# Patient Record
Sex: FEMALE | Race: WHITE
[De-identification: names, ages, dates, MRNs, and addresses within clinical notes are randomized per-mention and may not be internally consistent; named-entity substitution may affect disease eponyms.]

---

## 2021-04-26 ENCOUNTER — HOSPITAL ENCOUNTER (OUTPATIENT)
Dept: HOSPITAL 61 - NM | Age: 66
End: 2021-04-26
Attending: INTERNAL MEDICINE
Payer: COMMERCIAL

## 2021-04-26 DIAGNOSIS — I51.7: Primary | ICD-10-CM

## 2021-04-26 PROCEDURE — 93306 TTE W/DOPPLER COMPLETE: CPT

## 2021-04-26 PROCEDURE — 78452 HT MUSCLE IMAGE SPECT MULT: CPT

## 2021-04-26 PROCEDURE — 93017 CV STRESS TEST TRACING ONLY: CPT

## 2021-04-26 PROCEDURE — A9500 TC99M SESTAMIBI: HCPCS

## 2021-04-26 NOTE — CARD
MR#: [id number]

Account#: [id number]

Accession#: [id number]

Date of Study: 04/26/2021

Ordering Physician: ROSE MARY SEALS, 

Referring Physician: ROSE MARY SEALS, 

Tech: Yoko Barnhart, [facility]





--------------- APPROVED REPORT --------------





EXAM: Two-dimensional and M-mode echocardiogram with Doppler and color Doppler.



Other Information 

Quality : AverageHR: 64bpm



INDICATION

Chest Pain 



2D DIMENSIONS 

RVDd3.4 (2.9-3.5cm)Left Atrium(2D)3.3 (1.6-4.0cm)

IVSd1.0 (0.7-1.1cm)Aortic Root(2D)3.2 (2.0-3.7cm)

LVDd5.7 (3.9-5.9cm)LVOT Diameter2.1 (1.8-2.4cm)

PWd1.0 (0.7-1.1cm)LVDs3.6 (2.5-4.0cm)

FS (%) 36.0 %.1 ml

LVEF(%)60.0 (>50%)



Aortic Valve

AoV Peak Ken.136.3cm/sAoV VTI27.5cm

AO Peak GR.7.4mmHgLVOT Peak Ken.98.7cm/s

LVOT  VTI 20.71cmAO Mean GR.4mmHg

AYLIN (VMAX)1.39tr0KXZ   (VTI)2.49cm2



Mitral Valve

MV E Padksfwc19.9cm/sMV DECEL [id number]

MV A Frkpvegr06.9cm/sMV PLB78kw

E/A  Ratio0.8MVA (PHT)2.64cm2



TDI

E/Lateral E'8.8E/Medial E'11.5



Pulmonary Valve

PV Peak Ujewwbbp28.0cm/sPV Peak Grad.3mmHg



Tricuspid Valve

TR P. Yzgfrwvc302pq/sRAP WZNCIHLJ4fjUt

TR Peak Gr.22opJtJOOO44zkEe



Pulmonary Vein

S1 Itrsyulc65.4cm/sD2 Qculmfuz54.8cm/s

PVa hqgummsc951gdsy



 LEFT VENTRICLE 

The left ventricle is normal size. There is borderline to mild concentric left ventricular hypertroph
y. The left ventricular systolic function is normal. The Ejection Fraction is 55%. There is normal LV
 segmental wall motion. Transmitral Doppler flow pattern is Grade I-abnormal relaxation pattern.



 RIGHT VENTRICLE 

The right ventricle is normal size. There is normal right ventricular wall thickness. The right ventr
icular systolic function is normal.



 ATRIA 

The left atrium size is normal. The right atrium size is normal. The interatrial septum is intact wit
h no evidence for an atrial septal defect or patent foramen ovale as noted on 2-D or Doppler imaging.




 AORTIC VALVE 

The aortic valve is normal in structure and function. Doppler and Color Flow revealed trace aortic re
gurgitation. Calculated aortic valve area is 2.43 cm2 with maximum pressure gradient of 10 mmHg and m
clarence pressure gradient of 5 mmHg. There is no significant aortic valvular stenosis.



 MITRAL VALVE 

The mitral valve is normal in structure and function. There is no evidence of mitral valve prolapse. 
There is no mitral valve stenosis. Doppler and Color-flow revealed trace mitral regurgitation.



 TRICUSPID VALVE 

The tricuspid valve is normal in structure and function. Doppler and Color Flow revealed trace tricus
pid regurgitation with an estimated PAP of 20 mmHg. There is no tricuspid valve stenosis.



 PULMONIC VALVE 

The pulmonic valve is not well visualized. Doppler and Color Flow revealed trace pulmonic valvular re
gurgitation.



 GREAT VESSELS 

The aortic root is normal in size. The ascending aorta is normal in size. The IVC is normal in size a
nd collapses >50% with inspiration.



 PERICARDIAL EFFUSION 

There is no evidence of significant pericardial effusion.



Critical Notification

Critical Value: No



<Conclusion>

The left ventricular systolic function is normal.

The Ejection Fraction is 55%.

There is normal LV segmental wall motion.

Transmitral Doppler flow pattern is Grade I-abnormal relaxation pattern.

Trace mitral regurgitation.

Trace tricuspid regurgitation with an estimated PAP of 20 mmHg.

There is no evidence of significant pericardial effusion.



Signed by : Rose Mary Seals, 

Electronically Approved : 04/26/2021 14:32:38

## 2021-04-26 NOTE — RAD
MR#: [id number]

Account#: [id number]

Accession#: [id number]

Date of Study: 04/26/2021

Ordering Physician: ROSE MARY GARCIA 

Referring Physician: DENIS DURON Tech: RT MARISELA Adamson) (N)





--------------- APPROVED REPORT --------------





Test Type:          Pharmacological

Stress Nurse/Tech: MAURICIO Rodrigues RN

Test Indications: high blood pressure, chest pain

Cardiac History: No known cardiac

Medications:     See Electronic Medical Record

Medical History: See Electronic Medical Record

Resting ECG:     SR

Resting Heart Rate: 56 bpm

Resting Blood Pressure: 133/89mmHg

Pretest Chest Pain: None



Nurse/Tech Notes

Lungs CTA, S1S2

Consent: The procedure was explained to the patient in lay terms. Informed consent was witnessed. Gavino
eout was entered into "[employer] History and Stress Test performed by RT MARISELA Adamson) (N)



Pharm. Details

Pharmacologic stress testing was performed using 0.4mg per 5ml of regadenoson given intravenously ove
r 7-10 seconds.



Stress Symptoms

No chest pain or symptoms.



POST EXERCISE

Reason for Termination: Infusion complete

Max HR: 82 bpm

Max Blood Pressure: 131/78mmHg

Blood Pressure response to exercise: Normal blood pressure response during stress.

Heart Rate response to exercise: Normal response

Chest Pain: No. 

Arrhythmia: No. 

ST Change: No. 



INTERPRETATION

Stress EKG Conclusion: Baseline EKG showed sinus rhythm.  No ischemic changes at peak stress.  No arr
hythmias.



Imaging Protocol

IMAGE PROTOCOL: Rest Tc-99m/stress Tc-99m 1 day



Rest:            Stress:         Viability:   

Radiopharm.Tc99m OetlpolazWj73x Sestamibi

Kksy66bKa            32mCi            

Duration    13min.           13min.           

Img Date  04/26/2021 04/26/2021      

Inj-Img Hkgg13lhq.           60min.           



Rest Admin Site:IV - Right AntecubitalAdministrator:RT Juan Diego (R)(N)

Stress Admin Site: IV - Right AntecubitalAdministrator: RT Juan Diego ([facility])(N)



STRESS DATA

End Diast. Vol.149.0mlLVEDV index BSA79.0ml

End Syst. Vol.61.0mlLVESV index BSA32.0ml

Myocardial Qwby005.0gEject. Qcbqlaeh82.0%



Stress Scores

Regional WT0.00Summed WT2.00

Regional WM0.00Summed WM3.00



Study quality was good.  Left Ventricular size was Normal at Rest and Stress.

Lung uptake was .  Left Ventricular ejection fraction is 59%.

The rest and stress images show normal perfusion, normal contraction and thickening.



LV Perf. Quant

17 Seg. SSS2.00

17 Seg. SRS3.00

17 Seg. SDS0.00

Stress Defect Extent (% LAD)3.80Rest Defect Extent (% LAD)10.60Rev. Defect Extent (% LAD)0.60

Stress Defect Extent (% LCX) 5.00Rest Defect Extent (% LCX)13.80Rev. Defect Extent (% LCX)0.00

Stress Defect Extent (% RCA)0.00Rest Defect Extent (% RCA)0.00Rev. Defect Extent (% RCA)0.00

Stress Defect Extent (% RIANNA)2.60Rest Defect Extent (% RIANNA)8.00Rev. Defect Extent (% RIANNA)0.70



Conclusion

1. Regadenoson cardioisotope stress test did not show any evidence of ischemia or infarct.

2. Normal left ventricular systolic function with ejection fraction calculated at 59%.

3. Low risk for cardiac events.



Signed by : Rose Mary Garcia, 

Electronically Approved : 04/26/2021 13:08:31